# Patient Record
(demographics unavailable — no encounter records)

---

## 2024-11-15 NOTE — REASON FOR VISIT
[FreeTextEntry1] : 74 year old PMH atrial fibrillation, SHELLEY, HTN, HLD, prostate cancer, paroxysmal atrial fibrillation s/p CRYO PVI 9/15/22. He has complained of fatigue and "narcolepsy."  He has not been compliant with his CPAP for many years.  Does not routinely exercise.  Exertional tolerance 1-2 blocks is stable (pre-COVID 19 infection Summer 2022).   Tolerating Xarelto.  No bleeding issues on Xarelto.  He denies recurrent palpitation or rapid heart action.

## 2024-11-15 NOTE — CARDIOLOGY SUMMARY
[de-identified] : 15 November 2024:  [de-identified] : 20 Feb - 5 Mar 2024: No AFIB, brief AT, ectopy <1%

## 2025-01-15 NOTE — PHYSICAL EXAM
[General Appearance - Well Developed] : well developed [General Appearance - Well Nourished] : well nourished [Enlarged Base of the Tongue] : enlargement of the base of the tongue [III] : III [Heart Sounds] : normal S1 and S2 [Irregularly Irregular] : the rhythm was irregularly irregular [] : no respiratory distress [Auscultation Breath Sounds / Voice Sounds] : lungs were clear to auscultation bilaterally [No Focal Deficits] : no focal deficits [Oriented To Time, Place, And Person] : oriented to person, place, and time [Memory Recent] : recent memory was not impaired [FreeTextEntry1] : no edema

## 2025-01-15 NOTE — HISTORY OF PRESENT ILLNESS
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  Patient is sleepy with [place] sleepiness score of 16 and its affecting his memory.  Patient has very loud snoring, does not have any witnessed apneas.  Patient's bedtime is around midnight to 2 am wakes up in the morning around 9 AM.  He feels tired when he wakes up.  Patient drinks 1 cup of coffee during the daytime patient does not have any headaches or nocturia.  He is sleepy while driving sometimes. Patient tried cpap years ago without success. He will try cpap again as his sleep apnea symptoms has gotten worse will do a study and give him cpap will also start zepbound for sleep apnea.  Patient also is concerned about STD and asked me to send screen for HIV, syphilis.

## 2025-01-15 NOTE — ASSESSMENT
[FreeTextEntry1] : Assessment: The patient has a history of obstructive sleep apnea (SHELLEY), and his symptoms have worsened, with significant daytime sleepiness (ESS 16) and fatigue upon waking, which is impacting his memory. He has loud snoring but no witnessed apneas. His sleep pattern is irregular, with a late bedtime and excessive sleep duration. Given the progression of his symptoms, a trial of CPAP therapy is indicated. Although the patient had prior difficulty with CPAP, the worsening of his symptoms warrants another attempt.  The patient is also requesting STD screening for HIV and syphilis, which will be addressed.  Plan:  CPAP therapy: Reinitiate CPAP therapy for sleep apnea. A sleep study will be performed to evaluate current sleep patterns and to ensure appropriate CPAP settings. Pharmacologic treatment for sleep apnea: Initiate Zepbound  to help with weight loss and maintenance in conjunction with CPAP use. STD screening: Send lab work for HIV and syphilis as requested by the patient. Follow-up: Schedule follow-up in 1-2 months to assess CPAP adherence and effectiveness. Monitor progress with Zepbound and overall symptom improvement.

## 2025-01-17 NOTE — REASON FOR VISIT
[FreeTextEntry1] : 74 year old PMH atrial fibrillation, SHELLEY, HTN, HLD, prostate cancer, paroxysmal atrial fibrillation s/p CRYO PVI 9/15/22. He has complained of fatigue and "narcolepsy."  He has not been compliant with his CPAP for many years.  Does not routinely exercise.  Exertional tolerance 1-2 blocks is stable (pre-COVID 19 infection Summer 2022).   Tolerating Xarelto.  No bleeding issues on Xarelto.  He complains of excessive daytime somnolence and wants to see a pulmonologist again for CPAP evaluation/adjustment.  He denies recurrent palpitation or rapid heart action.

## 2025-02-24 NOTE — HISTORY OF PRESENT ILLNESS
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  Patient is sleepy with [place] sleepiness score of 16 and its affecting his memory.  Patient has very loud snoring, does not have any witnessed apneas.  Patient's bedtime is around midnight to 2 am wakes up in the morning around 9 AM.  He feels tired when he wakes up.  Patient drinks 1 cup of coffee during the daytime patient does not have any headaches or nocturia.  He is sleepy while driving sometimes. Patient tried cpap years ago without success. He will try cpap again as his sleep apnea symptoms has gotten worse will do a study and give him cpap will also start zepbound for sleep apnea.  Patient also is concerned about STD and asked me to send screen for HIV, syphilis.  2/24/25: Pt presents for Zepbound f/u.

## 2025-05-12 NOTE — ASSESSMENT
[FreeTextEntry1] : Assessment Obstructive Sleep Apnea (G923): History of severe SHELLEY with significant symptomatic improvement following 25-lb weight loss on Zepbound. [place] score improved from 16 to 6, snoring reduced, and patient feels rested upon waking. No witnessed apneas. Persistent memory issues may be related to residual SHELLEY, aging, or other comorbidities.  Memory Issues (R41.3): Persistent, potentially multifactorial (residual SHELLEY, age-related cognitive decline, or other etiology). Recommend neurology consultation if progressive. Weight Loss: Excellent response to Zepbound with 25-lb weight loss, likely contributing to SHELLEY symptom improvement. Plan Sleep Apnea Management: will repeat study to check for residual apnea after some more weight loss.  Discuss alternative SHELLEY therapies (e.g., oral appliance, hypoglossal nerve stimulator) if sleep study shows persistent moderate-severe SHELLEY. Reinforce sleep hygiene, including consistent sleep schedule. Weight Management: Continue Zepbound 7.5 mg, monitor for side effects and further weight loss.  Memory Issues: Monitor memory symptoms at follow-up.  neurology referral suggested to patient

## 2025-05-12 NOTE — HISTORY OF PRESENT ILLNESS
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 He lost about 25 lbs with zepbound so far.   Patient is not sleepy after the weight loss with [place] sleepiness score of 6, it improved from 16. He still has memory issues.  Patients snoring is less, does not have any witnessed apneas.  Patient's bedtime is around midnight to 2 am wakes up in the morning around 9 AM.  He feels rested when he wakes up.  Patient drinks 1 cup of coffee during the daytime patient does not have any headaches or nocturia.  He is not sleepy while driving. Patient tried cpap years ago without success. Weight improved from 237 to 212 lbs. He is on 7.5mg of zepbound. a1c 5.7 cbc normal ast 11 alt 8 lft normal albumin 4.5